# Patient Record
Sex: FEMALE | Race: OTHER | NOT HISPANIC OR LATINO | URBAN - METROPOLITAN AREA
[De-identification: names, ages, dates, MRNs, and addresses within clinical notes are randomized per-mention and may not be internally consistent; named-entity substitution may affect disease eponyms.]

---

## 2017-04-28 ENCOUNTER — EMERGENCY (EMERGENCY)
Facility: HOSPITAL | Age: 57
LOS: 1 days | Discharge: HOME | End: 2017-04-28
Attending: EMERGENCY MEDICINE
Payer: MEDICAID

## 2017-04-28 VITALS
DIASTOLIC BLOOD PRESSURE: 81 MMHG | RESPIRATION RATE: 20 BRPM | HEIGHT: 64 IN | SYSTOLIC BLOOD PRESSURE: 124 MMHG | TEMPERATURE: 100 F | WEIGHT: 164.91 LBS | OXYGEN SATURATION: 97 % | HEART RATE: 107 BPM

## 2017-04-28 DIAGNOSIS — Z91.013 ALLERGY TO SEAFOOD: ICD-10-CM

## 2017-04-28 DIAGNOSIS — R09.89 OTHER SPECIFIED SYMPTOMS AND SIGNS INVOLVING THE CIRCULATORY AND RESPIRATORY SYSTEMS: ICD-10-CM

## 2017-04-28 DIAGNOSIS — R05 COUGH: ICD-10-CM

## 2017-04-28 DIAGNOSIS — B34.9 VIRAL INFECTION, UNSPECIFIED: ICD-10-CM

## 2017-04-28 DIAGNOSIS — J34.89 OTHER SPECIFIED DISORDERS OF NOSE AND NASAL SINUSES: ICD-10-CM

## 2017-04-28 PROCEDURE — 99283 EMERGENCY DEPT VISIT LOW MDM: CPT | Mod: 25

## 2017-04-28 RX ORDER — ACETAMINOPHEN 500 MG
2 TABLET ORAL
Qty: 60 | Refills: 0 | OUTPATIENT
Start: 2017-04-28 | End: 2017-05-03

## 2017-04-28 RX ORDER — IBUPROFEN 200 MG
600 TABLET ORAL ONCE
Qty: 0 | Refills: 0 | Status: COMPLETED | OUTPATIENT
Start: 2017-04-28 | End: 2017-04-28

## 2017-04-28 RX ORDER — DEXAMETHASONE 0.5 MG/5ML
10 ELIXIR ORAL ONCE
Qty: 0 | Refills: 0 | Status: COMPLETED | OUTPATIENT
Start: 2017-04-28 | End: 2017-04-28

## 2017-04-28 NOTE — ED PROVIDER NOTE - NS ED MD SCRIBE ATTENDING SCRIBE SECTIONS
VITAL SIGNS( Pullset)/HISTORY OF PRESENT ILLNESS/PHYSICAL EXAM/PAST MEDICAL/SURGICAL/SOCIAL HISTORY/REVIEW OF SYSTEMS/HIV/DISPOSITION

## 2017-04-28 NOTE — ED PROVIDER NOTE - PHYSICAL EXAMINATION
GENERAL: No acute distress, non toxic  HEAD: Atraumatic, normocephalic  EARS: Externally normal, atraumatic, TMs normal bilaterally  EYES: No jaundice, not injected, no rupture, no foreign bodies  MOUTH: Moist mucous membranes, no open lesion, uvula midline without edema, no exudates, no peritonsilar abscess bilaterally. (+) tongue stained brown due to food  NECK: Supple, full range of motion, no swelling, (-)no lymphadenopathy.  HEART: Regular rate and rhythm, no murmurs, no rubs, no gallops  LUNGS: Clear to auscultation bilaterally without rhonci, rales, or wheezing  ABDOMEN: Soft and non tender in all 4 quadrants, normal bowel sounds, no signs of trauma, no costovertebral tenderness bilaterally  BACK/SPINE: Non tender spine in cervical/thoracic/lumbar regions, no stepoffs palpable  EXTREMITIES: No gross deformities  VASCULAR: Pulses palpable in all extremities, no pitting edema, capillary refill <2 secs  SKIN: Grossly intact without rash or open wounds  PSYCH: Alert and oriented x 3  GAIT: Normal without need for assistance

## 2017-04-28 NOTE — ED PROVIDER NOTE - OBJECTIVE STATEMENT
Son serves as  for pt: Elliot. 57 y/o female with no significant PMHx presents to the ED c/o cough, rhinorrhea, and chest congestion. Pt took Nyquil at 1700 today to no relief. Pt reports (+) sick contact:  Dx with Flu and currently taking Tamiflu. Pt denies N/V/D, abd pain, recent travel, or any other complaints.

## 2017-04-28 NOTE — ED PROVIDER NOTE - MEDICAL DECISION MAKING DETAILS
57 y/o F with cough and (+) recent sick contact Dx with Flu. Pt with benign exam; will provide meds and reassess. 55 y/o F with cough and (+) recent sick contact Dx with Flu. Pt with benign exam; will provide meds and reassess.  Pt improved with meds. Will send home f/u with PMD. Rturn for worsening symptoms.

## 2017-04-29 VITALS
OXYGEN SATURATION: 98 % | SYSTOLIC BLOOD PRESSURE: 115 MMHG | RESPIRATION RATE: 18 BRPM | HEART RATE: 97 BPM | DIASTOLIC BLOOD PRESSURE: 62 MMHG | TEMPERATURE: 98 F

## 2017-04-29 PROCEDURE — 99283 EMERGENCY DEPT VISIT LOW MDM: CPT | Mod: 25

## 2017-04-29 PROCEDURE — 96372 THER/PROPH/DIAG INJ SC/IM: CPT

## 2017-04-29 RX ADMIN — Medication 10 MILLIGRAM(S): at 00:31

## 2017-04-29 RX ADMIN — Medication 600 MILLIGRAM(S): at 00:33

## 2019-01-30 ENCOUNTER — EMERGENCY (EMERGENCY)
Facility: HOSPITAL | Age: 59
LOS: 1 days | Discharge: HOME | End: 2019-01-30
Attending: EMERGENCY MEDICINE | Admitting: EMERGENCY MEDICINE
Payer: MEDICAID

## 2019-01-30 VITALS
TEMPERATURE: 98 F | HEART RATE: 72 BPM | OXYGEN SATURATION: 97 % | DIASTOLIC BLOOD PRESSURE: 59 MMHG | RESPIRATION RATE: 15 BRPM | SYSTOLIC BLOOD PRESSURE: 109 MMHG

## 2019-01-30 VITALS
RESPIRATION RATE: 18 BRPM | HEART RATE: 73 BPM | DIASTOLIC BLOOD PRESSURE: 67 MMHG | TEMPERATURE: 98 F | OXYGEN SATURATION: 99 % | SYSTOLIC BLOOD PRESSURE: 108 MMHG

## 2019-01-30 PROCEDURE — 99285 EMERGENCY DEPT VISIT HI MDM: CPT | Mod: 25

## 2019-01-30 PROCEDURE — 93010 ELECTROCARDIOGRAM REPORT: CPT

## 2019-01-30 NOTE — ED ADULT TRIAGE NOTE - CHIEF COMPLAINT QUOTE
left side mastectomy and was D/Cd from [facility] noted to have 2 ERIKA drains with sero sanginous drainage  pt arrives A&ox3

## 2019-01-30 NOTE — ED PROVIDER NOTE - NS ED ROS FT
PGY1/MD Sofia.  CONST: no fevers, no chills, no trauma  EYES: no pain, no visual disturbances  ENT: no sore throat, no epistaxis, no rhinorrhea, no hearing changes  CV: no chest pain, no palpitations, no orthopnea, no extremity pain or swelling  RESP: no shortness of breath, no cough, no sputum, no pleurisy, no wheezing  ABD: no abdominal pain, no nausea, no vomiting, no diarrhea, no black or bloody stool  : no dysuria, no hematuria, no frequency, no urgency  MSK: no back pain, no neck pain, no extremity pain  NEURO: no headache, no sensory disturbances, no focal weakness, no dizziness  HEME: no easy bleeding or bruising  SKIN: no diaphoresis, no rash

## 2019-01-30 NOTE — ED PROVIDER NOTE - MEDICAL DECISION MAKING DETAILS
Dona: 58 F, lung cancer, c/o syncope today, no HA/CP/SOB/sz. activity, had diarrhea 2/2 laxative after surgery. Looks well. EKG unremarkable. Walked pt. around ED w/o Sx. Discharge home.

## 2019-01-30 NOTE — ED ADULT NURSE NOTE - OBJECTIVE STATEMENT
alert no distress   s/p witnessed syncopal episode   feels fine now   no c/o  seen by Dr Carcamo   2 left JPs intact  with serosanguinous drainage

## 2019-01-30 NOTE — ED PROVIDER NOTE - PHYSICAL EXAMINATION
PGY1/MD Sofia.   VITALS: reviewed  GEN: No apparent distress, A & O x 4  HEAD/EYES: NC/AT, PERRL, EOMI, no nystabmus, or decreased hearing acuity, anicteric sclerae, no conjunctival pallor  ENT: mucus membranes moist, oropharynx WNL, trachea midline, no JVD, neck is supple  RESP: lungs CTA with equal breath sounds bilaterally, chest wall nontender and atraumatic, left chest wall sugical site with no erythema or wormth  CV: heart with reg rhythm S1, S2, no murmur; distal pulses intact and symmetric bilaterally  ABDOMEN: normoactive bowel sounds, soft, nondistended, nontender, no palpable masses  MSK: extremities atraumatic and nontender, no edema, no asymmetry.  SKIN: warm, dry, no rash, no bruising, no cyanosis. color appropriate for ethnicity  NEURO: alert, mentating appropriately, no facial asymmetry. gross sensation, motor, coordination are intact  PSYCH: Affect appropriate

## 2019-01-30 NOTE — ED PROVIDER NOTE - PROGRESS NOTE DETAILS
PGY1/MD Sofia. Discussed with differentials including seizure (brain meta, hemorrhage, etc), arrhythmia, heat attack, PE (cancer, recent immobilazation), infection (pneumonia, UTI, surgicalsite), and recommendation is CT head, cxr, cbc, cmp, ua, urine culture, possibly CTA, but pt and her family does not want to receive any of these work up. EKG copy has been give. Pt vital is stable, EKG does not show lethal findings. Pt tolerates PO well. Likely dihydration, agrees with d/c. I have given the pt strict return and follow up precautions. The patient has been provided with a copy of all pertinent results. The patient has been informed of all concerning signs and symptoms to return to Emergency Department, the necessity to follow up with PMD/Clinic/follow up provided within 2-3 days was explained, and the patient reports understanding of above with capacity and insight.

## 2019-01-30 NOTE — ED PROVIDER NOTE - ATTENDING CONTRIBUTION TO CARE
I performed a face-to-face evaluation of the patient and performed a history and physical examination. I agree with the history and physical examination.    Dona: 58 F, lung cancer, c/o syncope today, no HA/CP/SOB/sz. activity, had diarrhea 2/2 laxative after surgery. Looks well. EKG unremarkable. Walked pt. around ED w/o Sx. Discharge home.

## 2019-01-30 NOTE — ED PROVIDER NOTE - OBJECTIVE STATEMENT
PGY1/MD Sofia. 59 yo F with Lt breast cancer, s/p mastectomy, discharged from [facility] on Monday, who p/f syncopic episode. Onset 430p, witnessed by her daughter, loc 1-2 min, with no prodrome or palpitation, no confusion, tongue bite, seizure, softly recovered her conscious. Pt has had cough x1wk, diarreha after discharge, watery no blood. Denies fever, surgical site pain, chest pain or sob. No calf pain or recent travel. Denies head trauma.

## 2019-01-30 NOTE — ED PROVIDER NOTE - NSFOLLOWUPINSTRUCTIONS_ED_ALL_ED_FT
You had a thorough evaluation including an exam, labs and imaging.  1. You were seen for syncope. A copy of your resulted labs, imaging, and findings have been provided to you.  2. Follow up with your surgeon/ primary care doctor within 48 hours. Please call [phone] to make an appointment or with any questions you may have.  3. Return immediately to the emergency department for new, persistent, or worsening symptoms or signs Return immediately to the emergency department if you have chest pain, loss of consciousness, headache, nauseous, vomiting, focal weakness, fever.